# Patient Record
Sex: FEMALE | Race: WHITE | NOT HISPANIC OR LATINO | URBAN - METROPOLITAN AREA
[De-identification: names, ages, dates, MRNs, and addresses within clinical notes are randomized per-mention and may not be internally consistent; named-entity substitution may affect disease eponyms.]

---

## 2022-02-08 ENCOUNTER — LAB OUTSIDE AN ENCOUNTER (OUTPATIENT)
Dept: URBAN - METROPOLITAN AREA CLINIC 70 | Facility: CLINIC | Age: 52
End: 2022-02-08

## 2022-02-08 ENCOUNTER — WEB ENCOUNTER (OUTPATIENT)
Dept: URBAN - METROPOLITAN AREA CLINIC 70 | Facility: CLINIC | Age: 52
End: 2022-02-08

## 2022-02-08 ENCOUNTER — OFFICE VISIT (OUTPATIENT)
Dept: URBAN - METROPOLITAN AREA CLINIC 70 | Facility: CLINIC | Age: 52
End: 2022-02-08
Payer: COMMERCIAL

## 2022-02-08 VITALS
DIASTOLIC BLOOD PRESSURE: 77 MMHG | TEMPERATURE: 97.3 F | SYSTOLIC BLOOD PRESSURE: 119 MMHG | BODY MASS INDEX: 29.64 KG/M2 | HEART RATE: 89 BPM | WEIGHT: 157 LBS | HEIGHT: 61 IN

## 2022-02-08 DIAGNOSIS — Z12.11 COLON CANCER SCREENING: ICD-10-CM

## 2022-02-08 DIAGNOSIS — R10.32 LLQ ABDOMINAL PAIN: ICD-10-CM

## 2022-02-08 DIAGNOSIS — K21.9 GASTROESOPHAGEAL REFLUX DISEASE, UNSPECIFIED WHETHER ESOPHAGITIS PRESENT: ICD-10-CM

## 2022-02-08 DIAGNOSIS — R13.19 OTHER DYSPHAGIA: ICD-10-CM

## 2022-02-08 DIAGNOSIS — R19.7 DIARRHEA OF PRESUMED INFECTIOUS ORIGIN: ICD-10-CM

## 2022-02-08 PROBLEM — 43240000: Status: ACTIVE | Noted: 2022-02-08

## 2022-02-08 PROCEDURE — 99204 OFFICE O/P NEW MOD 45 MIN: CPT

## 2022-02-08 RX ORDER — OMEPRAZOLE 40 MG/1
1 CAPSULE 30 MINUTES BEFORE MORNING MEAL CAPSULE, DELAYED RELEASE ORAL ONCE A DAY
OUTPATIENT

## 2022-02-08 RX ORDER — OMEPRAZOLE 40 MG/1
1 CAPSULE 30 MINUTES BEFORE MORNING MEAL CAPSULE, DELAYED RELEASE ORAL ONCE A DAY
Status: ACTIVE | COMMUNITY

## 2022-02-08 RX ORDER — ALPRAZOLAM 0.5 MG/1
1 TABLET TABLET ORAL TWICE A DAY
Status: ACTIVE | COMMUNITY

## 2022-02-08 RX ORDER — IRBESARTAN 75 MG/1
1 TABLET TABLET, FILM COATED ORAL ONCE A DAY
Status: ACTIVE | COMMUNITY

## 2022-02-08 NOTE — PHYSICAL EXAM GASTROINTESTINAL
Abdomen,  soft, Moderate LLQ TTP, nondistended,  no guarding or rigidity,  no masses palpable,  normal bowel sounds

## 2022-02-08 NOTE — HPI-TODAY'S VISIT:
Pt presents for LLQ abdominal pain, diarrhea, and dysphagia.  She states LLQ abdominal pain and diarrhea began about 2 months ago, but have increased over the past 2 weeks.  She describes the abdominal pain as a sharp pain.  She states diarrhea occurs 3-4 times a day.  She has not taken anything for the diarrhea.  She denies an association between the abdominal pain and diarrhea and denies improvement of abdominal pain with passage of bowel movements. She also denies association between meals and symptoms.  She denies recent sickness or sick contacts, recent travel, new medications or antibiotic use, food allergy, thyroid disorder, constipation, unintentional weight loss, or rectal bleeding.  Last colonoscopy was in 2002 due to changes in bowel movements.  Results were normal per the pt.  Her mother was diagnosed with rectal cancer at age 65.   Pt admits dysphagia to solids and liquids. She denies nausea, vomiting, associated regurgitation, hematemesis, or melena.  She is currently taking Omeprazole 40mg with occasional breakthrough symptoms.  Pt has never had an EGD.

## 2022-02-16 LAB
A/G RATIO: 2
ADENOVIRUS F 40/41: (no result)
ALBUMIN: 4.5
ALKALINE PHOSPHATASE: 78
ALT (SGPT): 27
AST (SGOT): 22
ASTROVIRUS: (no result)
BASO (ABSOLUTE): 0.1
BASOS: 1
BILIRUBIN, TOTAL: 0.4
BUN/CREATININE RATIO: 19
BUN: 14
C DIFFICILE TOXIN A/B: (no result)
C-REACTIVE PROTEIN, QUANT: 6
CALCIUM: 9
CALPROTECTIN, FECAL: 31
CAMPYLOBACTER: (no result)
CARBON DIOXIDE, TOTAL: 23
CHLORIDE: 98
CREATININE: 0.73
CRYPTOSPORIDIUM: (no result)
CYCLOSPORA CAYETANENSIS: (no result)
E COLI O157: (no result)
EGFR IF AFRICN AM: 112
EGFR IF NONAFRICN AM: 97
ENDOMYSIAL ANTIBODY IGA: NEGATIVE
ENTAMOEBA HISTOLYTICA: (no result)
ENTEROAGGREGATIVE E COLI: (no result)
ENTEROPATHOGENIC E COLI: (no result)
ENTEROTOXIGENIC E COLI: (no result)
EOS (ABSOLUTE): 0.1
EOS: 2
GIARDIA LAMBLIA: (no result)
GLOBULIN, TOTAL: 2.2
GLUCOSE: 81
HEMATOCRIT: 40.2
HEMATOLOGY COMMENTS:: (no result)
HEMOGLOBIN: 13.6
IMMATURE CELLS: (no result)
IMMATURE GRANS (ABS): 0
IMMATURE GRANULOCYTES: 0
IMMUNOGLOBULIN A, QN, SERUM: 198
LYMPHS (ABSOLUTE): 1.8
LYMPHS: 31
MCH: 30.8
MCHC: 33.8
MCV: 91
MONOCYTES(ABSOLUTE): 0.5
MONOCYTES: 9
NEUTROPHILS (ABSOLUTE): 3.2
NEUTROPHILS: 57
NOROVIRUS GI/GII: (no result)
NRBC: (no result)
PANCREATIC ELASTASE, FECAL: 239
PLATELETS: 329
PLESIOMONAS SHIGELLOIDES: (no result)
POTASSIUM: 4.3
PROTEIN, TOTAL: 6.7
RBC: 4.42
RDW: 12.4
REQUEST PROBLEM: (no result)
ROTAVIRUS A: (no result)
SALMONELLA: (no result)
SAPOVIRUS: (no result)
SEDIMENTATION RATE-WESTERGREN: 5
SHIGA-TOXIN-PRODUCING E COLI: (no result)
SHIGELLA/ENTEROINVASIVE E COLI: (no result)
SODIUM: 137
T-TRANSGLUTAMINASE (TTG) IGA: <2
VIBRIO CHOLERAE: (no result)
VIBRIO: (no result)
WBC: 5.7
YERSINIA ENTEROCOLITICA: (no result)

## 2022-02-28 PROBLEM — 305058001: Status: ACTIVE | Noted: 2022-02-08

## 2022-02-28 PROBLEM — 235595009: Status: ACTIVE | Noted: 2022-02-08

## 2022-02-28 PROBLEM — 40739000: Status: ACTIVE | Noted: 2022-02-08

## 2022-03-08 ENCOUNTER — CLAIMS CREATED FROM THE CLAIM WINDOW (OUTPATIENT)
Dept: URBAN - METROPOLITAN AREA CLINIC 4 | Facility: CLINIC | Age: 52
End: 2022-03-08
Payer: COMMERCIAL

## 2022-03-08 ENCOUNTER — OFFICE VISIT (OUTPATIENT)
Dept: URBAN - METROPOLITAN AREA SURGERY CENTER 24 | Facility: SURGERY CENTER | Age: 52
End: 2022-03-08
Payer: COMMERCIAL

## 2022-03-08 ENCOUNTER — TELEPHONE ENCOUNTER (OUTPATIENT)
Dept: URBAN - METROPOLITAN AREA CLINIC 92 | Facility: CLINIC | Age: 52
End: 2022-03-08

## 2022-03-08 DIAGNOSIS — K31.89 FOCAL FOVEOLAR HYPERPLASIA: ICD-10-CM

## 2022-03-08 DIAGNOSIS — K63.89 SMALL BOWEL EDEMA: ICD-10-CM

## 2022-03-08 DIAGNOSIS — D12.3 ADENOMA OF TRANSVERSE COLON: ICD-10-CM

## 2022-03-08 DIAGNOSIS — K21.9 GASTRO-ESOPHAGEAL REFLUX DISEASE WITHOUT ESOPHAGITIS: ICD-10-CM

## 2022-03-08 DIAGNOSIS — R13.19 CERVICAL DYSPHAGIA: ICD-10-CM

## 2022-03-08 DIAGNOSIS — K21.9 ACID REFLUX: ICD-10-CM

## 2022-03-08 DIAGNOSIS — D12.3 BENIGN NEOPLASM OF TRANSVERSE COLON: ICD-10-CM

## 2022-03-08 PROCEDURE — 88313 SPECIAL STAINS GROUP 2: CPT | Performed by: PATHOLOGY

## 2022-03-08 PROCEDURE — 45380 COLONOSCOPY AND BIOPSY: CPT | Performed by: INTERNAL MEDICINE

## 2022-03-08 PROCEDURE — 88312 SPECIAL STAINS GROUP 1: CPT | Performed by: PATHOLOGY

## 2022-03-08 PROCEDURE — 88342 IMHCHEM/IMCYTCHM 1ST ANTB: CPT | Performed by: PATHOLOGY

## 2022-03-08 PROCEDURE — G8907 PT DOC NO EVENTS ON DISCHARG: HCPCS | Performed by: INTERNAL MEDICINE

## 2022-03-08 PROCEDURE — 88305 TISSUE EXAM BY PATHOLOGIST: CPT | Performed by: PATHOLOGY

## 2022-03-08 PROCEDURE — 43239 EGD BIOPSY SINGLE/MULTIPLE: CPT | Performed by: INTERNAL MEDICINE

## 2022-03-08 RX ORDER — IRBESARTAN 75 MG/1
1 TABLET TABLET, FILM COATED ORAL ONCE A DAY
Status: ACTIVE | COMMUNITY

## 2022-03-08 RX ORDER — ALPRAZOLAM 0.5 MG/1
1 TABLET TABLET ORAL TWICE A DAY
Status: ACTIVE | COMMUNITY

## 2022-03-08 RX ORDER — OMEPRAZOLE 40 MG/1
1 CAPSULE 30 MINUTES BEFORE MORNING MEAL CAPSULE, DELAYED RELEASE ORAL ONCE A DAY
Status: ACTIVE | COMMUNITY

## 2022-03-08 RX ORDER — PANTOPRAZOLE SODIUM 40 MG/1
1 TABLET TABLET, DELAYED RELEASE ORAL ONCE A DAY
Qty: 30 | OUTPATIENT
Start: 2022-03-08

## 2022-04-01 ENCOUNTER — OFFICE VISIT (OUTPATIENT)
Dept: URBAN - METROPOLITAN AREA CLINIC 70 | Facility: CLINIC | Age: 52
End: 2022-04-01
Payer: COMMERCIAL

## 2022-04-01 ENCOUNTER — DASHBOARD ENCOUNTERS (OUTPATIENT)
Age: 52
End: 2022-04-01

## 2022-04-01 VITALS
DIASTOLIC BLOOD PRESSURE: 79 MMHG | SYSTOLIC BLOOD PRESSURE: 145 MMHG | WEIGHT: 156.8 LBS | TEMPERATURE: 97.7 F | HEART RATE: 64 BPM | BODY MASS INDEX: 29.6 KG/M2 | HEIGHT: 61 IN

## 2022-04-01 DIAGNOSIS — R10.32 LLQ ABDOMINAL PAIN: ICD-10-CM

## 2022-04-01 DIAGNOSIS — K59.1 FUNCTIONAL DIARRHEA: ICD-10-CM

## 2022-04-01 DIAGNOSIS — R09.89 GLOBUS SENSATION: ICD-10-CM

## 2022-04-01 PROCEDURE — 99213 OFFICE O/P EST LOW 20 MIN: CPT

## 2022-04-01 RX ORDER — IRBESARTAN 75 MG/1
1 TABLET TABLET, FILM COATED ORAL ONCE A DAY
Status: ACTIVE | COMMUNITY

## 2022-04-01 RX ORDER — PANTOPRAZOLE SODIUM 40 MG/1
1 TABLET TABLET, DELAYED RELEASE ORAL ONCE A DAY
Qty: 30 | Status: ACTIVE | COMMUNITY
Start: 2022-03-08

## 2022-04-01 RX ORDER — ALPRAZOLAM 0.5 MG/1
1 TABLET TABLET ORAL TWICE A DAY
Status: ACTIVE | COMMUNITY

## 2022-04-01 RX ORDER — OMEPRAZOLE 40 MG/1
1 CAPSULE 30 MINUTES BEFORE MORNING MEAL CAPSULE, DELAYED RELEASE ORAL ONCE A DAY
Status: ACTIVE | COMMUNITY

## 2022-04-01 NOTE — HPI-TODAY'S VISIT:
Pt presents for a f/u for diarrhea, LLQ abdominal pain, GERD, and dysphagia. Diarrhea labs WNL. CT 2/11/2022 showed a lung nodule and constipation.  Pt was instructed to f/u with PCP for further evaluation of lung finding. Colonoscopy 3/8/2022 showed diverticula in the sigmoid, descending, and transverse colon.  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  EGD 3/8/2022 showed gastritis and no evidence of stricture.  Pathology was negative for EOE, H. pylori, and dysplasia. Today the pt states states that last week she was costipated, but now stools have returned to a loose consistency.  She has not tried anything for bowel movements. She states LLQ abdominal pain continues to be sharp. She continues to take Omeprazole 40mg and states GERD symptoms are well managed with the medication.  She states globus sensation continues to occur throughout the day.  She denies recent thyroid imaging or swallow studies. She denies weight loss or rectal bleeding.

## 2024-06-28 ENCOUNTER — LAB OUTSIDE AN ENCOUNTER (OUTPATIENT)
Dept: URBAN - METROPOLITAN AREA CLINIC 88 | Facility: CLINIC | Age: 54
End: 2024-06-28

## 2024-06-28 ENCOUNTER — OFFICE VISIT (OUTPATIENT)
Dept: URBAN - METROPOLITAN AREA CLINIC 88 | Facility: CLINIC | Age: 54
End: 2024-06-28
Payer: COMMERCIAL

## 2024-06-28 VITALS
TEMPERATURE: 98.1 F | BODY MASS INDEX: 22.77 KG/M2 | DIASTOLIC BLOOD PRESSURE: 72 MMHG | HEART RATE: 61 BPM | SYSTOLIC BLOOD PRESSURE: 109 MMHG | OXYGEN SATURATION: 100 % | HEIGHT: 61 IN | WEIGHT: 120.6 LBS

## 2024-06-28 DIAGNOSIS — K58.1 IRRITABLE BOWEL SYNDROME WITH CONSTIPATION: ICD-10-CM

## 2024-06-28 DIAGNOSIS — N20.0 KIDNEY STONES: ICD-10-CM

## 2024-06-28 DIAGNOSIS — Z80.1 FAMILY HISTORY OF LUNG CANCER: ICD-10-CM

## 2024-06-28 DIAGNOSIS — R91.1 PULMONARY NODULE, RIGHT: ICD-10-CM

## 2024-06-28 DIAGNOSIS — K21.00 GASTROESOPHAGEAL REFLUX DISEASE WITH ESOPHAGITIS WITHOUT HEMORRHAGE: ICD-10-CM

## 2024-06-28 DIAGNOSIS — R10.12 LUQ ABDOMINAL PAIN: ICD-10-CM

## 2024-06-28 PROBLEM — 440630006: Status: ACTIVE | Noted: 2024-06-28

## 2024-06-28 PROBLEM — 786838002: Status: ACTIVE | Noted: 2024-06-28

## 2024-06-28 PROBLEM — 95570007: Status: ACTIVE | Noted: 2024-06-28

## 2024-06-28 PROBLEM — 266433003: Status: ACTIVE | Noted: 2024-06-28

## 2024-06-28 PROCEDURE — 99214 OFFICE O/P EST MOD 30 MIN: CPT | Performed by: NURSE PRACTITIONER

## 2024-06-28 RX ORDER — WHEAT DEXTRIN 3 G/3.5 G
MIX 2 TEASPOONS IN AT LEAST 8OZ OF FLUID POWDER (GRAM) ORAL ONCE A DAY
Qty: 1 CANNISTER | Refills: 11 | OUTPATIENT
Start: 2024-06-28 | End: 2025-06-23

## 2024-06-28 RX ORDER — OMEPRAZOLE 40 MG/1
1 CAPSULE 30 MINUTES BEFORE MORNING MEAL CAPSULE, DELAYED RELEASE ORAL ONCE A DAY
Status: ACTIVE | COMMUNITY

## 2024-06-28 RX ORDER — OMEPRAZOLE 40 MG/1
1 CAPSULE 30 MINUTES BEFORE MORNING MEAL CAPSULE, DELAYED RELEASE ORAL ONCE A DAY
OUTPATIENT

## 2024-06-28 RX ORDER — ALPRAZOLAM 0.5 MG/1
1 TABLET TABLET ORAL TWICE A DAY
Status: ACTIVE | COMMUNITY

## 2024-06-28 RX ORDER — PANTOPRAZOLE SODIUM 40 MG/1
1 TABLET TABLET, DELAYED RELEASE ORAL ONCE A DAY
Qty: 30 | Status: DISCONTINUED | COMMUNITY
Start: 2022-03-08

## 2024-06-28 RX ORDER — IRBESARTAN 75 MG/1
1 TABLET TABLET, FILM COATED ORAL ONCE A DAY
Status: ACTIVE | COMMUNITY

## 2024-06-28 NOTE — HPI-TODAY'S VISIT:
53 y.o. presents today for evaluation of increasing LUQ abdominal pain over the last 6 months.  Feels her pain is increasing in intensity, especially with movement.  Certain positions, like sitting, turning certain positions, etc.  Describes it as quick sharp, popping pain that lasts less than minute.  Denies associated symptoms or alleviating factors since pain last less than 5 minutes.    Voices a hx of constipation.  Defecation occurs every every otehr day to once a week.  Denies defecation improving her pain.  Usually administers an enema monthly to induce complete evacuation.  States she feels better after this, minus her LUQ abdominal pain. States her last abdominal x-ray in January 2024 revealed signs of constipation.    Last CT A/P in 2022:  Multiple bilateral renal stones w/o signs of obstruction or ureteral stones, fatty liver, mild constipation, two less than 1 cm sized right lower lobe lesions, fat containing umbilical hernia.  CT chest recommended to evaluate findings further.  Patient states scan was not approved by insurance.  Voicing concern given her mother's diagnosis of lung cancer.    Last EGD and colonoscopy procedures was in 2022.  EGD:  Normal esophagus but bx + reflux esophagitis, gastritis (neg h. pylori).  Colonoscopy:  diverticulosis and benign polyp x 1.

## 2024-06-28 NOTE — HPI-OTHER HISTORIES
--------------------------------------------------------- Office note 04/01/2022 by MAMADOU Cota: Pt presents for a f/u for diarrhea, LLQ abdominal pain, GERD, and dysphagia. Diarrhea labs WNL. CT 2/11/2022 showed a lung nodule and constipation. Pt was instructed to f/u with PCP for further evaluation of lung finding. Colonoscopy 3/8/2022 showed diverticula in the sigmoid, descending, and transverse colon. 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 EGD 3/8/2022 showed gastritis and no evidence of stricture. Pathology was negative for EOE, H. pylori, and dysplasia. Today the pt states states that last week she was costipated, but now stools have returned to a loose consistency. She has not tried anything for bowel movements. She states LLQ abdominal pain continues to be sharp. She continues to take Omeprazole 40mg and states GERD symptoms are well managed with the medication. She states globus sensation continues to occur throughout the day. She denies recent thyroid imaging or swallow studies. She denies weight loss or rectal bleeding.

## 2024-08-19 ENCOUNTER — OFFICE VISIT (OUTPATIENT)
Dept: URBAN - METROPOLITAN AREA CLINIC 88 | Facility: CLINIC | Age: 54
End: 2024-08-19

## 2024-08-19 RX ORDER — WHEAT DEXTRIN 3 G/3.5 G
MIX 2 TEASPOONS IN AT LEAST 8OZ OF FLUID POWDER (GRAM) ORAL ONCE A DAY
Qty: 1 CANNISTER | Refills: 11 | Status: ACTIVE | COMMUNITY
Start: 2024-06-28 | End: 2025-06-23

## 2024-08-19 RX ORDER — IRBESARTAN 75 MG/1
1 TABLET TABLET, FILM COATED ORAL ONCE A DAY
Status: ACTIVE | COMMUNITY

## 2024-08-19 RX ORDER — ALPRAZOLAM 0.5 MG/1
1 TABLET TABLET ORAL TWICE A DAY
Status: ACTIVE | COMMUNITY

## 2024-08-19 RX ORDER — OMEPRAZOLE 40 MG/1
1 CAPSULE 30 MINUTES BEFORE MORNING MEAL CAPSULE, DELAYED RELEASE ORAL ONCE A DAY
Status: ACTIVE | COMMUNITY

## 2025-06-20 ENCOUNTER — P2P PATIENT RECORD (OUTPATIENT)
Age: 55
End: 2025-06-20